# Patient Record
Sex: MALE | Race: WHITE
[De-identification: names, ages, dates, MRNs, and addresses within clinical notes are randomized per-mention and may not be internally consistent; named-entity substitution may affect disease eponyms.]

---

## 2018-10-20 ENCOUNTER — HOSPITAL ENCOUNTER (EMERGENCY)
Dept: HOSPITAL 25 - UCEAST | Age: 33
Discharge: HOME | End: 2018-10-20
Payer: COMMERCIAL

## 2018-10-20 VITALS — DIASTOLIC BLOOD PRESSURE: 90 MMHG | SYSTOLIC BLOOD PRESSURE: 139 MMHG

## 2018-10-20 DIAGNOSIS — Z88.6: ICD-10-CM
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
## 2018-10-20 DIAGNOSIS — Z23: ICD-10-CM
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
## 2018-10-20 DIAGNOSIS — F41.9: ICD-10-CM

## 2018-10-20 DIAGNOSIS — S81.811A: Primary | ICD-10-CM
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
## 2018-10-20 DIAGNOSIS — Z87.891: ICD-10-CM

## 2018-10-20 PROCEDURE — 99203 OFFICE O/P NEW LOW 30 MIN: CPT

## 2018-10-20 PROCEDURE — 90715 TDAP VACCINE 7 YRS/> IM: CPT

## 2018-10-20 PROCEDURE — G0463 HOSPITAL OUTPT CLINIC VISIT: HCPCS

## 2018-10-20 NOTE — UC
Laceration HPI





- HPI Summary


HPI Summary: 


slipped and fell while fishing about 3 hours ago laceration on right lower leg








- History Of Current Complaint


Chief Complaint: UCLaceration


Stated Complaint: RIGHT LEG LAC


Time Seen by Provider: 10/20/18 14:04


Hx Obtained From: Patient


Laceration Location: Calf - anterior right lower leg


Mechanism Of Injury: Blunt Trauma


Onset/Duration: Sudden Onset


Pain Intensity: 1


Pain Scale Used: 0-10 Numeric


Aggravating Factors: Nothing





- Allergies/Home Medications


Allergies/Adverse Reactions: 


 Allergies











Allergy/AdvReac Type Severity Reaction Status Date / Time


 


aspirin Allergy  GI Upset Verified 10/20/18 12:34











Home Medications: 


 Home Medications





busPIRone TAB* [Buspar TAB*] 5 mg PO BID 10/20/18 [History Confirmed 10/20/18]











PMH/Surg Hx/FS Hx/Imm Hx


Previously Healthy: No


Psychological History: Anxiety





- Surgical History


Surgical History: None





- Family History


Known Family History: Positive: None





- Social History


Occupation: Employed Full-time


Lives: With Family


Alcohol Use: Weekly


Substance Use Type: None


Smoking Status (MU): Former Smoker





Review of Systems


Constitutional: Negative


Skin: Other - laceration right lower leg (actually skin avulsion)


Eyes: Negative


ENT: Negative


Respiratory: Negative


Cardiovascular: Negative


Gastrointestinal: Negative


Genitourinary: Negative


Motor: Negative


Neurovascular: Negative


Musculoskeletal: Negative


Neurological: Negative


Psychological: Negative


Is Patient Immunocompromised?: No


All Other Systems Reviewed And Are Negative: Yes





Physical Exam


Triage Information Reviewed: Yes


Appearance: Well-Appearing, No Pain Distress, Well-Nourished


Vital Signs: 


 Initial Vital Signs











Temp  99.0 F   10/20/18 12:30


 


Pulse  91   10/20/18 12:30


 


Resp  18   10/20/18 12:30


 


BP  139/90   10/20/18 12:30


 


Pulse Ox  99   10/20/18 12:30











Vital Signs Reviewed: Yes


Eye Exam: Normal


Eyes: Positive: Conjunctiva Clear


ENT Exam: Normal


ENT: Positive: Normal ENT inspection, Hearing grossly normal.  Negative: Trismus

, Muffled voice, Hoarse voice


Dental Exam: Normal


Neck exam: Normal


Neck: Positive: Supple, Nontender, No Lymphadenopathy


Respiratory Exam: Normal


Respiratory: Positive: Chest non-tender, No respiratory distress, No accessory 

muscle use


Cardiovascular Exam: Normal


Cardiovascular: Positive: RRR, Pulses Normal, Brisk Capillary Refill


Musculoskeletal Exam: Normal


Musculoskeletal: Positive: Strength Intact, ROM Intact, No Edema


Neurological Exam: Normal


Neurological: Positive: Alert, Muscle Tone Normal


Psychological Exam: Normal


Skin Exam: Other


Skin: Positive: Other - 2cm long 1 cm at max width oval shaped skin avulsion





Laceration Repair





- Laceration Repair


  ** 1


Description: Irregular - oval shaped 1 cm wide 2 cm long---skin avulsion


Modified For Repair: No


Cleansing Completed Via Routine Prep: Yes


Irrigation With Pressure Irrigation Device: Yes





Laceration Course/Dx





- Course/Dx


Course Of Treatment: wound not closable---lyly , keflex up date Boostrix, wash 

bid and follow with pcp prn





- Differential Dx - Laceration/Wound


Provider Diagnoses: 2 cm skin avulsion right lower leg, update boostrix





Discharge





- Sign-Out/Discharge


Documenting (check all that apply): Patient Departure


All imaging exams completed and their final reports reviewed: No Studies





- Discharge Plan


Condition: Stable


Disposition: HOME


Prescriptions: 


Cephalexin CAP* [Keflex CAP*] 500 mg PO TID #21 cap


Patient Education Materials:  Skin Avulsion (ED), Tetanus (ED)


Referrals: 


[facility] [Outside] - If Needed





- Billing Disposition and Condition


Condition: STABLE


Disposition: Home